# Patient Record
Sex: MALE | Race: WHITE | Employment: OTHER | URBAN - METROPOLITAN AREA
[De-identification: names, ages, dates, MRNs, and addresses within clinical notes are randomized per-mention and may not be internally consistent; named-entity substitution may affect disease eponyms.]

---

## 2020-07-01 ENCOUNTER — TRANSFERRED RECORDS (OUTPATIENT)
Dept: HEALTH INFORMATION MANAGEMENT | Facility: CLINIC | Age: 40
End: 2020-07-01

## 2020-07-22 ENCOUNTER — TRANSFERRED RECORDS (OUTPATIENT)
Dept: HEALTH INFORMATION MANAGEMENT | Facility: CLINIC | Age: 40
End: 2020-07-22

## 2020-07-22 LAB — TSH SERPL-ACNC: 2.78 UIU/ML (ref 0.4–3.99)

## 2020-08-10 ENCOUNTER — MEDICAL CORRESPONDENCE (OUTPATIENT)
Dept: HEALTH INFORMATION MANAGEMENT | Facility: CLINIC | Age: 40
End: 2020-08-10

## 2020-08-18 ENCOUNTER — TRANSCRIBE ORDERS (OUTPATIENT)
Dept: OTHER | Age: 40
End: 2020-08-18

## 2020-08-18 DIAGNOSIS — N46.01: Primary | ICD-10-CM

## 2020-08-20 ENCOUNTER — TELEPHONE (OUTPATIENT)
Dept: UROLOGY | Facility: CLINIC | Age: 40
End: 2020-08-20

## 2020-08-20 NOTE — TELEPHONE ENCOUNTER
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 pt is currently in [place] until oct. writer unable to find in clinic appt until late in the year. pt would like to be seen when pt comes back from [place]. please advise     Action Taken: Message routed to:  Adult Clinics: Urology p [id number]

## 2020-08-20 NOTE — TELEPHONE ENCOUNTER
Left generic voicemail for patient to return call to clinic. When call is returned will set up a new patient appointment with Dr. Marie.    Za Chapin LPN